# Patient Record
Sex: MALE | Race: WHITE | NOT HISPANIC OR LATINO | URBAN - METROPOLITAN AREA
[De-identification: names, ages, dates, MRNs, and addresses within clinical notes are randomized per-mention and may not be internally consistent; named-entity substitution may affect disease eponyms.]

---

## 2021-10-13 ENCOUNTER — OFFICE VISIT (OUTPATIENT)
Dept: URBAN - METROPOLITAN AREA CLINIC 118 | Facility: CLINIC | Age: 31
End: 2021-10-13
Payer: COMMERCIAL

## 2021-10-13 VITALS
SYSTOLIC BLOOD PRESSURE: 118 MMHG | WEIGHT: 217 LBS | DIASTOLIC BLOOD PRESSURE: 91 MMHG | TEMPERATURE: 97.7 F | HEIGHT: 73 IN | HEART RATE: 86 BPM | BODY MASS INDEX: 28.76 KG/M2

## 2021-10-13 DIAGNOSIS — K59.01 CONSTIPATION BY DELAYED COLONIC TRANSIT: ICD-10-CM

## 2021-10-13 DIAGNOSIS — K57.92 DIVERTICULITIS: ICD-10-CM

## 2021-10-13 DIAGNOSIS — R10.32 LLQ ABDOMINAL PAIN: ICD-10-CM

## 2021-10-13 PROBLEM — 35298007: Status: ACTIVE | Noted: 2021-10-13

## 2021-10-13 PROBLEM — 307496006: Status: ACTIVE | Noted: 2021-10-13

## 2021-10-13 PROCEDURE — 99204 OFFICE O/P NEW MOD 45 MIN: CPT | Performed by: INTERNAL MEDICINE

## 2021-10-13 RX ORDER — ONDANSETRON 4 MG/1
1 TABLET ON THE TONGUE AND ALLOW TO DISSOLVE TABLET, ORALLY DISINTEGRATING ORAL TID
Qty: 90 TABLET | Refills: 0

## 2021-10-13 NOTE — HPI-TODAY'S VISIT:
2 weeks LLQ pain, severe, cramping, associated with constipation and nausea, radiates to the lower abd  CT 10/8/21 Minimal diverticulitis involving the sigmoid colon. No abscess is seen

## 2021-11-03 ENCOUNTER — DASHBOARD ENCOUNTERS (OUTPATIENT)
Age: 31
End: 2021-11-03

## 2021-11-15 ENCOUNTER — OFFICE VISIT (OUTPATIENT)
Dept: URBAN - METROPOLITAN AREA CLINIC 118 | Facility: CLINIC | Age: 31
End: 2021-11-15

## 2021-11-15 VITALS — HEIGHT: 73 IN

## 2021-11-15 RX ORDER — ONDANSETRON 4 MG/1
1 TABLET ON THE TONGUE AND ALLOW TO DISSOLVE TABLET, ORALLY DISINTEGRATING ORAL TID
Qty: 90 TABLET | Refills: 0 | Status: ACTIVE | COMMUNITY